# Patient Record
(demographics unavailable — no encounter records)

---

## 2025-06-06 NOTE — ASSESSMENT
[FreeTextEntry1] :  Mr. Schroeder presents for initial evaluation of urinary retention Developed during acute hemorrhoid episode that was treated bedside in the ED No previous retention episode, constipation, or voiding complaints  Successful office TOV today, PVR: 20  Recommendations -bowel regimen with 1 normal-consistency BM qd -RTC 2 weeks for PVR check  I have spent 45 minutes of time on the encounter which excludes teaching and separately reported services

## 2025-06-06 NOTE — PHYSICAL EXAM
[General Appearance - Well Developed] : well developed [General Appearance - Well Nourished] : well nourished [Abdomen Soft] : soft [Abdomen Tenderness] : non-tender [de-identified] : Cuenca secure draining clear yellow urine

## 2025-06-06 NOTE — HISTORY OF PRESENT ILLNESS
[FreeTextEntry1] : 50M presents for initial evaluation of urinary retention    PMH significant for: hemorrhoids  PSH significant for: nothing   Significant meds: nothing     Seen in [facility] ED on 6/2 complaining of rectal pain and difficulty urinating s/p drainage of thrombosed hemorrhoid  Dx with urinary retention  No documented PVR or Cuenca placement   Unknown volume drained No previous history of retention or constipation

## 2025-06-09 NOTE — PHYSICAL EXAM
[Normal Breath Sounds] : Normal breath sounds [Normal Heart Sounds] : normal heart sounds [Normal Rate and Rhythm] : normal rate and rhythm [No Rash or Lesion] : No rash or lesion [Alert] : alert [Oriented to Person] : oriented to person [Oriented to Time] : oriented to time [Oriented to Place] : oriented to place [Calm] : calm [de-identified] : Soft/ND [de-identified] : NAD [de-identified] : NC/AT [de-identified] : Normal  [de-identified] : +ROM/HANCOCK [de-identified] : Intact

## 2025-06-09 NOTE — REVIEW OF SYSTEMS
[Feeling Tired] : feeling tired [As Noted in HPI] : as noted in HPI [Negative] : Heme/Lymph [FreeTextEntry3] : wears glasses

## 2025-06-09 NOTE — ASSESSMENT
Addended by: BRYAN HU on: 1/21/2025 02:57 PM     Modules accepted: Level of Service     [FreeTextEntry1] : 50-year-old male with severe thrombosed external hemorrhoid.  I believe the patient will need surgery for this.  Plan:  Exam under anesthesia hemorrhoidectomy at ambulatory surgery

## 2025-06-09 NOTE — HISTORY OF PRESENT ILLNESS
[FreeTextEntry1] : 49 y/o male presents here for hemorrhoids. Patient has a long hx pf hemorrhoids. He went to the [facility] on the 2nd for a thrombosed hemorrhoid. At the hospital they did and an incision and drained it. He states he got a blood transfusion at the hospital, and he received hydrocortisone, Tylenol and a nitroglycerin ointment.  He's had a banding done in the past.  Patient states he still having some rectal pain and brownish/bloody discharge. Daily BM with occasional straining.  Patient denies abd pain, nausea/vomiting, constipation/diarrhea, rectal bleeding and weight loss.    Colonoscopy was done last year NL study.

## 2025-06-12 NOTE — DISCUSSION/SUMMARY
[FreeTextEntry1] : Currently stable from a cardiovascular standpoint. Normotensive. Euvolemic. Episode of epigastric pain likely noncardiac in origin. Continue current care. ECG completed today and reviewed (findings as noted above). At this time, patient is considered an acceptable risk from a cardiac standpoint for the anticipated hemorrhoid surgery. Patient to consider cardiac testing in the future for cardiac risk stratification. [EKG obtained to assist in diagnosis and management of assessed problem(s)] : EKG obtained to assist in diagnosis and management of assessed problem(s)

## 2025-06-12 NOTE — REVIEW OF SYSTEMS
Addended by: INDU TEJEDA on: 11/21/2023 09:57 AM     Modules accepted: Orders    
[Negative] : Musculoskeletal

## 2025-06-12 NOTE — HISTORY OF PRESENT ILLNESS
[FreeTextEntry1] : Currently doing okay. Denies chest pain, shortness of breath or palpitations. Recently found to be anemic likely secondary to hemorrhoids. Anticipating hemorrhoid surgery tomorrow. Had an episode of epigastric pain last Thu which has resolved. Patient thinks he may have been just hungry.